# Patient Record
Sex: FEMALE | Race: WHITE | NOT HISPANIC OR LATINO | Employment: FULL TIME | URBAN - METROPOLITAN AREA
[De-identification: names, ages, dates, MRNs, and addresses within clinical notes are randomized per-mention and may not be internally consistent; named-entity substitution may affect disease eponyms.]

---

## 2023-03-06 LAB — URINE CULTURE: NO GROWTH

## 2023-05-27 LAB — URINE CULTURE: NORMAL

## 2023-08-26 LAB — URINE CULTURE: NORMAL

## 2023-12-26 ENCOUNTER — LAB REQUISITION (OUTPATIENT)
Dept: LAB | Facility: HOSPITAL | Age: 28
End: 2023-12-26
Payer: COMMERCIAL

## 2023-12-26 DIAGNOSIS — R30.0 DYSURIA: ICD-10-CM

## 2023-12-26 DIAGNOSIS — L29.2 PRURITUS VULVAE: ICD-10-CM

## 2023-12-26 PROCEDURE — 87086 URINE CULTURE/COLONY COUNT: CPT

## 2023-12-27 LAB
BACTERIAL VAGINOSIS VAG-IMP: ABNORMAL
CLUE CELLS VAG LPF-#/AREA: PRESENT /[LPF]
NUGENT SCORE: 6
YEAST VAG WET PREP-#/AREA: PRESENT

## 2023-12-29 LAB — BACTERIA UR CULT: NORMAL

## 2024-01-03 ENCOUNTER — OFFICE VISIT (OUTPATIENT)
Dept: OBSTETRICS AND GYNECOLOGY | Facility: CLINIC | Age: 29
End: 2024-01-03
Payer: COMMERCIAL

## 2024-01-03 VITALS
DIASTOLIC BLOOD PRESSURE: 78 MMHG | WEIGHT: 155 LBS | SYSTOLIC BLOOD PRESSURE: 124 MMHG | HEIGHT: 64 IN | BODY MASS INDEX: 26.46 KG/M2

## 2024-01-03 DIAGNOSIS — N89.8 VAGINA ITCHING: Primary | ICD-10-CM

## 2024-01-03 PROCEDURE — 1036F TOBACCO NON-USER: CPT | Performed by: MIDWIFE

## 2024-01-03 PROCEDURE — 99213 OFFICE O/P EST LOW 20 MIN: CPT | Performed by: MIDWIFE

## 2024-01-03 NOTE — PROGRESS NOTES
Subjective   Patient ID: Grace Metzger is a 28 y.o. female who presents for vulvar itch and swelling. Pt says sx started and 12/23 she was seen at ER and culture showed VVC and BV for which she was txed but she says she had hives after fluconazole and nausea after Flagyl and did not complete either  Pt says she has been using OTC Monistat 7 and she has had improvement in her sx with this but not fully    HPI  PMHx: pt says she had Covid dx 14 days ago  SocHx: with partner 1 yr    Review of Systems   Genitourinary:  Positive for vaginal discharge and vaginal pain.       Objective   Physical Exam  Genitourinary:     General: Normal vulva.      Comments: Scant whitish discharge      Physical Exam  Vitals and nursing note reviewed.   Constitutional:       Appearance: Normal appearance.   HENT:     Head/Face: Normal  Eyes:      Pupils: Pupils are equal, round, and reactive to light.   Pulmonary:      Effort: Pulmonary effort is normal.     Abdominal:      Palpations: Abdomen is soft. There is no mass.      Tenderness: There is no abdominal tenderness.   Musculoskeletal:         General: Normal range of motion.   Lymphadenopathy:      Cervical: No cervical adenopathy.         General: Skin is warm and dry.   Neurological:      Mental Status: She is alert and oriented    Psychiatric:         Mood and Affect: Mood normal.     Assessment/Plan   Diagnoses and all orders for this visit:  Vagina itching    Reassurance given re exam findings  Vulvar care discussed at length  Tx to be based on [facility] results  RTO AE/PRN       XIOMARA Frye, ND 01/03/24 3:43 PM

## 2024-01-08 DIAGNOSIS — B96.89 BACTERIAL VAGINOSIS: Primary | ICD-10-CM

## 2024-01-08 DIAGNOSIS — N76.0 BACTERIAL VAGINOSIS: Primary | ICD-10-CM

## 2024-01-08 RX ORDER — CLINDAMYCIN HYDROCHLORIDE 300 MG/1
CAPSULE ORAL
Qty: 14 CAPSULE | Refills: 0 | Status: SHIPPED | OUTPATIENT
Start: 2024-01-08 | End: 2024-01-20 | Stop reason: WASHOUT

## 2024-01-12 ENCOUNTER — APPOINTMENT (OUTPATIENT)
Dept: OBSTETRICS AND GYNECOLOGY | Facility: CLINIC | Age: 29
End: 2024-01-12
Payer: COMMERCIAL

## 2024-01-20 ENCOUNTER — OFFICE VISIT (OUTPATIENT)
Dept: OBSTETRICS AND GYNECOLOGY | Facility: CLINIC | Age: 29
End: 2024-01-20
Payer: COMMERCIAL

## 2024-01-20 VITALS
DIASTOLIC BLOOD PRESSURE: 75 MMHG | TEMPERATURE: 97 F | WEIGHT: 148 LBS | HEART RATE: 93 BPM | BODY MASS INDEX: 25.4 KG/M2 | SYSTOLIC BLOOD PRESSURE: 112 MMHG

## 2024-01-20 DIAGNOSIS — R35.0 INCREASED URINARY FREQUENCY: Primary | ICD-10-CM

## 2024-01-20 DIAGNOSIS — R30.0 DYSURIA: ICD-10-CM

## 2024-01-20 LAB
POC BLOOD, URINE: NEGATIVE
POC COLOR, URINE: YELLOW
POC GLUCOSE, URINE: NEGATIVE MG/DL
POC KETONES, URINE: NEGATIVE MG/DL
POC LEUKOCYTES, URINE: NEGATIVE

## 2024-01-20 PROCEDURE — 81003 URINALYSIS AUTO W/O SCOPE: CPT | Performed by: MIDWIFE

## 2024-01-20 PROCEDURE — 87086 URINE CULTURE/COLONY COUNT: CPT

## 2024-01-20 PROCEDURE — 1036F TOBACCO NON-USER: CPT | Performed by: MIDWIFE

## 2024-01-20 PROCEDURE — 99213 OFFICE O/P EST LOW 20 MIN: CPT | Performed by: MIDWIFE

## 2024-01-20 ASSESSMENT — ENCOUNTER SYMPTOMS
DYSURIA: 1
FREQUENCY: 1

## 2024-01-20 NOTE — PROGRESS NOTES
"Subjective   Patient ID: Grace Metzger is a 28 y.o. female who presents for c/o x2-3 days dysuria, urinary frequency/urgency and strong odor to urine.  Pt says she has \"an icy feeling below bellybutton\".  Pt completed clindamycin for tx of BV earlier this week and is no longer having vaginal c/o.  No chief complaint on file..  HPI  PMHx: BV txed 1/8/24  SocHx: pt drinks mostly water and juice, rare caffeine; with partner 1 yr  ROS: no flank pain, no vaginal c/o, no fever, NAD  Review of Systems   Genitourinary:  Positive for dysuria, frequency and urgency.        Suprapubic \"icy feeling\"   All other systems reviewed and are negative.      Objective   Physical Exam  Abdominal:      Tenderness: There is no right CVA tenderness or left CVA tenderness.       Physical Exam  Vitals and nursing note reviewed.   Constitutional:       Appearance: Normal appearance.   HENT:     Head/Face: Normal  Eyes:      Pupils: Pupils are equal, round, and reactive to light.   Pulmonary:      Effort: Pulmonary effort is normal.     Abdominal:      Palpations: Abdomen is soft. There is no mass.      Tenderness: There is no abdominal tenderness.   Musculoskeletal:         General: Normal range of motion.   Lymphadenopathy:      Cervical: No cervical adenopathy.         General: Skin is warm and dry.   Neurological:      Mental Status: She is alert and oriented    Psychiatric:         Mood and Affect: Mood normal.     Assessment/Plan   Diagnoses and all orders for this visit:  Increased urinary frequency  -     Urine Culture  Dysuria  -     POCT UA Automated manually resulted  -     Urine Culture    We discussed exam findings suggestive of bladder infection, presumptive tx offered as we await urine culture results.  Pt declined presumptive tx, stating she gets relief when she takes ibuprofen.  Pt was advised to increase water intake and to contact our office if sx worsen.  RTO AE/PRN       XIOMARA Frye, ND 01/20/24 10:43 AM   "

## 2024-01-22 LAB — BACTERIA UR CULT: NORMAL

## 2024-06-05 ENCOUNTER — LAB REQUISITION (OUTPATIENT)
Dept: LAB | Facility: HOSPITAL | Age: 29
End: 2024-06-05
Payer: COMMERCIAL

## 2024-06-05 DIAGNOSIS — N76.0 ACUTE VAGINITIS: ICD-10-CM

## 2024-06-05 PROCEDURE — 87205 SMEAR GRAM STAIN: CPT

## 2024-06-07 LAB
CLUE CELLS VAG LPF-#/AREA: ABNORMAL /[LPF]
NUGENT SCORE: 2
YEAST VAG WET PREP-#/AREA: PRESENT

## 2024-06-12 ENCOUNTER — APPOINTMENT (OUTPATIENT)
Dept: OBSTETRICS AND GYNECOLOGY | Facility: CLINIC | Age: 29
End: 2024-06-12
Payer: COMMERCIAL

## 2024-06-12 VITALS — WEIGHT: 154 LBS | SYSTOLIC BLOOD PRESSURE: 118 MMHG | BODY MASS INDEX: 26.43 KG/M2 | DIASTOLIC BLOOD PRESSURE: 64 MMHG

## 2024-06-12 DIAGNOSIS — N89.8 VAGINAL IRRITATION: Primary | ICD-10-CM

## 2024-06-12 PROCEDURE — 99213 OFFICE O/P EST LOW 20 MIN: CPT | Performed by: MIDWIFE

## 2024-06-12 PROCEDURE — 1036F TOBACCO NON-USER: CPT | Performed by: MIDWIFE

## 2024-06-12 RX ORDER — TERCONAZOLE 8 MG/G
CREAM VAGINAL
Qty: 20 G | Refills: 0 | Status: SHIPPED | OUTPATIENT
Start: 2024-06-12

## 2024-06-12 NOTE — PROGRESS NOTES
Subjective   Patient ID: Grace Metzger is a 28 y.o. female who presents for vaginal irritation. That started about 7 days ago and has improved but not fully resolved since using 3 day Terconazole which she finished 2 days ago that she was Rxed at ER.   She continues to have very mild irritation.  HPI  PMHx: h/o allergic rxn to Fluconazole but Terconazole is tolerated w/o problems per pt  SocHx: 2 yrs with partner, last IC 2 wks ago  ROS: no pelvic pain, no urinary c/o, NAD    Review of Systems    Objective   Physical Exam  HENT:      Head: Normocephalic.   Pulmonary:      Effort: Pulmonary effort is normal.   Genitourinary:     Vagina: Vaginal discharge present.      Comments: Mild vulvar erythema w/o lesions; whitish discharge scant  Neurological:      Mental Status: She is alert.   Psychiatric:         Behavior: Behavior normal.         Thought Content: Thought content normal.         Assessment/Plan   Diagnoses and all orders for this visit:  Vaginal irritation  -     terconazole (Terazol 3) 0.8 % vaginal cream; Apply pea-sized amount of cream to affected area x 3 days.    Exam findings and vulvar care discussed  RTO AE/PRN       ELSA Frye-IRAJ, ND 06/12/24 2:27 PM    No

## 2024-12-30 ENCOUNTER — OFFICE VISIT (OUTPATIENT)
Dept: OBSTETRICS AND GYNECOLOGY | Facility: CLINIC | Age: 29
End: 2024-12-30
Payer: COMMERCIAL

## 2024-12-30 VITALS
WEIGHT: 167 LBS | BODY MASS INDEX: 28.51 KG/M2 | HEIGHT: 64 IN | DIASTOLIC BLOOD PRESSURE: 82 MMHG | SYSTOLIC BLOOD PRESSURE: 128 MMHG

## 2024-12-30 DIAGNOSIS — N76.0 BV (BACTERIAL VAGINOSIS): Primary | ICD-10-CM

## 2024-12-30 DIAGNOSIS — Z11.3 SCREENING EXAMINATION FOR STI: ICD-10-CM

## 2024-12-30 DIAGNOSIS — R35.0 URINARY FREQUENCY: ICD-10-CM

## 2024-12-30 DIAGNOSIS — B96.89 BV (BACTERIAL VAGINOSIS): Primary | ICD-10-CM

## 2024-12-30 LAB
POC BLOOD, URINE: NEGATIVE
POC GLUCOSE, URINE: NEGATIVE MG/DL
POC LEUKOCYTES, URINE: NEGATIVE
POC NITRITE,URINE: NEGATIVE
POC PROTEIN, URINE: NEGATIVE MG/DL

## 2024-12-30 PROCEDURE — 99213 OFFICE O/P EST LOW 20 MIN: CPT | Performed by: MIDWIFE

## 2024-12-30 PROCEDURE — 3008F BODY MASS INDEX DOCD: CPT | Performed by: MIDWIFE

## 2024-12-30 PROCEDURE — 87591 N.GONORRHOEAE DNA AMP PROB: CPT

## 2024-12-30 PROCEDURE — 87491 CHLMYD TRACH DNA AMP PROBE: CPT

## 2024-12-30 PROCEDURE — 1036F TOBACCO NON-USER: CPT | Performed by: MIDWIFE

## 2024-12-30 PROCEDURE — 81002 URINALYSIS NONAUTO W/O SCOPE: CPT | Performed by: MIDWIFE

## 2024-12-30 RX ORDER — CLINDAMYCIN HYDROCHLORIDE 300 MG/1
300 CAPSULE ORAL 2 TIMES DAILY
Qty: 14 CAPSULE | Refills: 0 | Status: SHIPPED | OUTPATIENT
Start: 2024-12-30 | End: 2025-01-06

## 2024-12-30 NOTE — PROGRESS NOTES
Subjective   Patient ID: Grace Metzger is a 29 y.o. female who presents for Vaginitis/Bacterial Vaginosis. Pt c/o vaginal irritation and was seen at outside provider 24 with vaginal culture showing +BV.  She also c/o urinary frequency for the past week.   HPI  PMHx: ; Vaginal pathogen testing +BV 24; h/o rxn to fluconazole but terconazole is tolerated  SocHx: with partner 2 yrs condoms used  ROS: no pelvic pain, no urinary c/o, NAD  Review of Systems    Objective   Physical Exam  Constitutional:       Appearance: Normal appearance.   HENT:      Head: Normocephalic.   Pulmonary:      Effort: Pulmonary effort is normal.   Genitourinary:     General: Normal vulva.      Urethra: Urethral lesion present. No prolapse, urethral pain or urethral swelling.      Vagina: Normal.   Lymphadenopathy:      Lower Body: No right inguinal adenopathy. No left inguinal adenopathy.   Neurological:      Mental Status: She is alert.   Psychiatric:         Behavior: Behavior normal.         Thought Content: Thought content normal.         Assessment/Plan   Diagnoses and all orders for this visit:  BV (bacterial vaginosis)  -     clindamycin (Cleocin) 300 mg capsule; Take 1 capsule (300 mg) by mouth 2 times a day for 7 days.  Urinary frequency  -     POCT UA (nonautomated) manually resulted  Screening examination for STI  -     C. trachomatis / N. gonorrhoeae, Amplified       Reassurance given re exam  Vulvar care discussed  RTO AE/PRN    XIOMARA Frye, ND 24 1:27 PM

## 2024-12-31 LAB
C TRACH RRNA SPEC QL NAA+PROBE: NEGATIVE
N GONORRHOEA DNA SPEC QL PROBE+SIG AMP: NEGATIVE

## 2025-01-08 ENCOUNTER — OFFICE VISIT (OUTPATIENT)
Dept: OBSTETRICS AND GYNECOLOGY | Facility: CLINIC | Age: 30
End: 2025-01-08
Payer: COMMERCIAL

## 2025-01-08 VITALS
HEIGHT: 64 IN | DIASTOLIC BLOOD PRESSURE: 58 MMHG | BODY MASS INDEX: 28.51 KG/M2 | WEIGHT: 167 LBS | SYSTOLIC BLOOD PRESSURE: 102 MMHG

## 2025-01-08 DIAGNOSIS — N89.8 VAGINAL IRRITATION: Primary | ICD-10-CM

## 2025-01-08 DIAGNOSIS — R35.0 FREQUENCY OF MICTURITION: ICD-10-CM

## 2025-01-08 PROCEDURE — 3008F BODY MASS INDEX DOCD: CPT | Performed by: MIDWIFE

## 2025-01-08 PROCEDURE — 1036F TOBACCO NON-USER: CPT | Performed by: MIDWIFE

## 2025-01-08 PROCEDURE — 99213 OFFICE O/P EST LOW 20 MIN: CPT | Performed by: MIDWIFE

## 2025-01-08 PROCEDURE — 87205 SMEAR GRAM STAIN: CPT

## 2025-01-08 PROCEDURE — 81002 URINALYSIS NONAUTO W/O SCOPE: CPT | Performed by: MIDWIFE

## 2025-01-08 PROCEDURE — 87086 URINE CULTURE/COLONY COUNT: CPT

## 2025-01-08 NOTE — PROGRESS NOTES
Subjective   Patient ID: Grace Metzger is a 29 y.o. female who presents for vulvar concerns and Urinary Frequency. Pt finished Clindamycin tx for BV 2 days ago.  She says that 2 days ago she had a 25 minute episode of vulvovaginal irritation worse near her clitoris, and that yesterday she had urinary frequency w/o dysuria. She is concerned that vaginitis is not resolved and that she may now have a UTI.  HPI  PMHx: ; vaginal pathogen testing +BV 24; h/o rxn to fluconazole but terconazole is tolerated per pt report; GC/CT testing negative 24  SocHx: 2 yrs with partner, SA, pt says she drinks water and gatorade all day  ROS: no pelvic pain, no CVAT, no flank pain, NAD  Review of Systems    Objective   Physical Exam  Constitutional:       Appearance: Normal appearance.   HENT:      Head: Normocephalic.   Pulmonary:      Effort: Pulmonary effort is normal.   Abdominal:      Tenderness: There is no right CVA tenderness or left CVA tenderness.   Genitourinary:     General: Normal vulva.      Urethra: No prolapse, urethral pain, urethral swelling or urethral lesion.      Vagina: Normal.      Comments: Scant whitish discharge  Lymphadenopathy:      Lower Body: No right inguinal adenopathy. No left inguinal adenopathy.   Neurological:      Mental Status: She is alert.   Psychiatric:         Behavior: Behavior normal.         Thought Content: Thought content normal.         Assessment/Plan   Diagnoses and all orders for this visit:  Vaginal irritation  -     Vaginitis Gram Stain For Bacterial Vaginosis + Yeast  Frequency of micturition  -     POCT UA (nonautomated) manually resulted  -     Urine Culture       Reassurance given re exam  Vulvar care discussed and pt advised to start use of good quality probiotics, given h/o vaginitis  Tx to be based on testing sent today.  RTO AE/PRN    XIOMARA Frye, ND 25 9:11 AM

## 2025-01-09 LAB
BACTERIA UR CULT: NORMAL
BACTERIAL VAGINOSIS VAG-IMP: NORMAL
CLUE CELLS VAG LPF-#/AREA: NORMAL /[LPF]
NUGENT SCORE: 4
YEAST VAG WET PREP-#/AREA: NORMAL

## 2025-04-30 ENCOUNTER — OFFICE VISIT (OUTPATIENT)
Dept: OBSTETRICS AND GYNECOLOGY | Facility: CLINIC | Age: 30
End: 2025-04-30
Payer: COMMERCIAL

## 2025-04-30 VITALS
WEIGHT: 165 LBS | BODY MASS INDEX: 28.17 KG/M2 | SYSTOLIC BLOOD PRESSURE: 112 MMHG | HEIGHT: 64 IN | DIASTOLIC BLOOD PRESSURE: 66 MMHG

## 2025-04-30 DIAGNOSIS — N89.8 VAGINAL IRRITATION: Primary | ICD-10-CM

## 2025-04-30 PROCEDURE — 3008F BODY MASS INDEX DOCD: CPT | Performed by: MIDWIFE

## 2025-04-30 PROCEDURE — 99213 OFFICE O/P EST LOW 20 MIN: CPT | Performed by: MIDWIFE

## 2025-04-30 PROCEDURE — 1036F TOBACCO NON-USER: CPT | Performed by: MIDWIFE

## 2025-04-30 NOTE — PROGRESS NOTES
"Subjective   Patient ID: Grace Metzger is a 29 y.o. female who presents for vulvar vaginal itch. That started 6 days ago and pt used Monistat 7 for 4 days and sx improved.  She stopped Monistat use 2 days ago in preparation for this visit, but then menses started today.    HPI  PMHx: ; vaginal pathogen testing +BV 24; h/o rxn to fluconazole but terconazole is tolerated per pt report; GC/CT testing negative 24  SocHx: 2 yrs with partner, SA-- condoms used \"always\" per pt, pt says she drinks water and gatorade all day; pt says that she has not been eating well lately  ROS: no pelvic pain, no CVAT, no flank pain,  Review of Systems    Objective   Physical Exam  Constitutional:       Appearance: Normal appearance.   HENT:      Head: Normocephalic.   Pulmonary:      Effort: Pulmonary effort is normal.   Genitourinary:     General: Normal vulva.      Vagina: Bleeding present.   Neurological:      Mental Status: She is alert.   Psychiatric:         Behavior: Behavior normal.         Thought Content: Thought content normal.         Assessment/Plan   Diagnoses and all orders for this visit:  Vaginal irritation  -     QUEST MISCELLANEOUS TEST (ROOM TEMPERATURE)    Vulvovaginal skin care discussed.  Tx to be based on testing  RTO AE/PRN       XIOMARA Frye, ND 25 2:03 PM   "

## 2025-05-01 DIAGNOSIS — B96.89 BV (BACTERIAL VAGINOSIS): Primary | ICD-10-CM

## 2025-05-01 DIAGNOSIS — N76.0 BV (BACTERIAL VAGINOSIS): Primary | ICD-10-CM

## 2025-05-01 LAB — QUEST FLEXITEST1 RESULTS:: NORMAL

## 2025-05-01 RX ORDER — CLINDAMYCIN HYDROCHLORIDE 300 MG/1
CAPSULE ORAL
Qty: 14 CAPSULE | Refills: 0 | Status: SHIPPED | OUTPATIENT
Start: 2025-05-01

## 2025-05-14 ENCOUNTER — OFFICE VISIT (OUTPATIENT)
Dept: OBSTETRICS AND GYNECOLOGY | Facility: CLINIC | Age: 30
End: 2025-05-14
Payer: COMMERCIAL

## 2025-05-14 VITALS — WEIGHT: 165 LBS | BODY MASS INDEX: 28.32 KG/M2 | SYSTOLIC BLOOD PRESSURE: 110 MMHG | DIASTOLIC BLOOD PRESSURE: 72 MMHG

## 2025-05-14 DIAGNOSIS — N89.8 VAGINAL ITCHING: Primary | ICD-10-CM

## 2025-05-14 PROCEDURE — 1036F TOBACCO NON-USER: CPT | Performed by: MIDWIFE

## 2025-05-14 PROCEDURE — 99213 OFFICE O/P EST LOW 20 MIN: CPT | Performed by: MIDWIFE

## 2025-05-14 NOTE — PROGRESS NOTES
Subjective   Patient ID: Grace Metzger is a 29 y.o. female who presents for Vaginal Itching. That started after last IC 4 days ago with increased vaginal discharge    PMHx: ; vaginal pathogen testing +BV 24; h/o rxn to fluconazole but terconazole is tolerated per pt report; GC/CT testing negative 24   SocHx: 2 yrs with partner condoms used always; pt says she has been eating more sugar lately d/t stress  ROS: no pelvic pain, no urinary c/o, NAD  Vaginal Itching        Review of Systems    Objective   Physical Exam  Constitutional:       Appearance: Normal appearance.   HENT:      Head: Normocephalic.   Pulmonary:      Effort: Pulmonary effort is normal.   Genitourinary:     General: Normal vulva.      Vagina: Vaginal discharge present.      Comments: Thin whitish discharge  Neurological:      Mental Status: She is alert.   Psychiatric:         Behavior: Behavior normal.         Thought Content: Thought content normal.         Assessment/Plan   Diagnoses and all orders for this visit:  Vaginal itching  -     QUEST MISCELLANEOUS TEST (ROOM TEMPERATURE)    Vulvovaginal care discussed.  Pt advised to limit intake of sugar  RTO AE/PRN       ELSA Frye-IRAJ, ND 25 1:18 PM

## 2025-05-15 ENCOUNTER — TELEPHONE (OUTPATIENT)
Dept: OBSTETRICS AND GYNECOLOGY | Facility: CLINIC | Age: 30
End: 2025-05-15
Payer: COMMERCIAL

## 2025-05-15 LAB — QUEST FLEXITEST1 RESULTS:: NORMAL

## 2025-05-16 ENCOUNTER — TELEMEDICINE (OUTPATIENT)
Dept: PRIMARY CARE | Facility: CLINIC | Age: 30
End: 2025-05-16
Payer: COMMERCIAL

## 2025-05-16 DIAGNOSIS — N76.0 BV (BACTERIAL VAGINOSIS): ICD-10-CM

## 2025-05-16 DIAGNOSIS — B96.89 BV (BACTERIAL VAGINOSIS): ICD-10-CM

## 2025-05-16 PROCEDURE — 99212 OFFICE O/P EST SF 10 MIN: CPT | Performed by: FAMILY MEDICINE

## 2025-05-16 RX ORDER — CLINDAMYCIN HYDROCHLORIDE 300 MG/1
CAPSULE ORAL
Qty: 14 CAPSULE | Refills: 0 | Status: SHIPPED | OUTPATIENT
Start: 2025-05-16

## 2025-05-16 NOTE — PROGRESS NOTES
Subjective   Patient ID: Grace Metzger is a 29 y.o. female who presents for BV    HPI   29-year-old female presents the [facility] virtual care visit requesting prescription for her bacterial vaginosis.  Was seen by her gynecologist on 5/14 and had a culture sent that came back positive for BV.  Her gynecologist is out of the office until Monday and she would like to get started on treatment.  She has a allergy to metronidazole.  She has a history of BV in the past and has been treated with oral clindamycin  Review of Systems  ROS as stated in HPI  Objective   LMP 04/30/2025     Physical Exam  Virtual visit so no physical exam was done; patient appears alert and oriented and in no acute distress  Assessment/Plan   Problem List Items Addressed This Visit    None  Visit Diagnoses         Codes      BV (bacterial vaginosis)     N76.0, B96.89    Relevant Medications    clindamycin (Cleocin) 300 mg capsule        Reviewed culture from 5/14 which was positive for BV   rx clindamycin   fu with gyn if symptoms persist or worsen  This visit was completed via VIRTUAL visit. All issues as above were discussed and addressed but no physical exam or vital signs were performed. If it was felt that the patient should be evaluated in clinic then they were directed there. The patient verbally consented to visit.

## 2025-05-19 DIAGNOSIS — B96.89 BV (BACTERIAL VAGINOSIS): ICD-10-CM

## 2025-05-19 DIAGNOSIS — N76.0 BV (BACTERIAL VAGINOSIS): ICD-10-CM

## 2025-05-19 RX ORDER — CLINDAMYCIN HYDROCHLORIDE 300 MG/1
CAPSULE ORAL
Qty: 14 CAPSULE | Refills: 0 | Status: SHIPPED | OUTPATIENT
Start: 2025-05-19

## 2025-06-08 ENCOUNTER — OFFICE VISIT (OUTPATIENT)
Dept: URGENT CARE | Facility: URGENT CARE | Age: 30
End: 2025-06-08
Payer: COMMERCIAL

## 2025-06-08 VITALS
SYSTOLIC BLOOD PRESSURE: 129 MMHG | DIASTOLIC BLOOD PRESSURE: 81 MMHG | WEIGHT: 167.77 LBS | BODY MASS INDEX: 28.64 KG/M2 | OXYGEN SATURATION: 98 % | TEMPERATURE: 98 F | HEART RATE: 58 BPM | RESPIRATION RATE: 16 BRPM | HEIGHT: 64 IN

## 2025-06-08 DIAGNOSIS — B37.31 VAGINA, CANDIDIASIS: ICD-10-CM

## 2025-06-08 DIAGNOSIS — N89.8 VAGINAL ITCHING: Primary | ICD-10-CM

## 2025-06-08 LAB
POC APPEARANCE, URINE: CLEAR
POC BACTERIAL VAGINITIS (RAPID): NEGATIVE
POC BILIRUBIN, URINE: NEGATIVE
POC BLOOD, URINE: NEGATIVE
POC COLOR, URINE: YELLOW
POC GLUCOSE, URINE: NEGATIVE MG/DL
POC KETONES, URINE: NEGATIVE MG/DL
POC LEUKOCYTES, URINE: NEGATIVE
POC NITRITE,URINE: NEGATIVE
POC PH, URINE: 7.5 PH
POC PROTEIN, URINE: NEGATIVE MG/DL
POC SPECIFIC GRAVITY, URINE: 1.01
POC UROBILINOGEN, URINE: 0.2 EU/DL
PREGNANCY TEST URINE, POC: NEGATIVE

## 2025-06-08 PROCEDURE — 87905 IA NZMTC ACTV OTH/THN VIRUS: CPT | Performed by: NURSE PRACTITIONER

## 2025-06-08 PROCEDURE — 81003 URINALYSIS AUTO W/O SCOPE: CPT | Performed by: NURSE PRACTITIONER

## 2025-06-08 PROCEDURE — 3008F BODY MASS INDEX DOCD: CPT | Performed by: NURSE PRACTITIONER

## 2025-06-08 PROCEDURE — 99214 OFFICE O/P EST MOD 30 MIN: CPT | Performed by: NURSE PRACTITIONER

## 2025-06-08 PROCEDURE — 1036F TOBACCO NON-USER: CPT | Performed by: NURSE PRACTITIONER

## 2025-06-08 PROCEDURE — 81025 URINE PREGNANCY TEST: CPT | Performed by: NURSE PRACTITIONER

## 2025-06-08 RX ORDER — ALPRAZOLAM 0.5 MG/1
TABLET ORAL
COMMUNITY
Start: 2024-09-04

## 2025-06-08 RX ORDER — FLUCONAZOLE 150 MG/1
150 TABLET ORAL DAILY
Qty: 2 TABLET | Refills: 0 | Status: SHIPPED | OUTPATIENT
Start: 2025-06-08 | End: 2025-06-10

## 2025-06-08 ASSESSMENT — ENCOUNTER SYMPTOMS
DIARRHEA: 0
DIAPHORESIS: 0
ABDOMINAL PAIN: 0
DIFFICULTY URINATING: 0
CHILLS: 0
MYALGIAS: 0
NECK PAIN: 0
BACK PAIN: 0
ACTIVITY CHANGE: 0
FATIGUE: 0
LIGHT-HEADEDNESS: 0
NECK STIFFNESS: 0
VOMITING: 0
FREQUENCY: 0
ABDOMINAL DISTENTION: 0
BLOOD IN STOOL: 0
CHEST TIGHTNESS: 0
CONFUSION: 0
DYSURIA: 0
NAUSEA: 0
FEVER: 0
PALPITATIONS: 0
SHORTNESS OF BREATH: 0
WHEEZING: 0
HEMATURIA: 0
APPETITE CHANGE: 0
HEADACHES: 0
COUGH: 0
DIZZINESS: 0
FLANK PAIN: 0

## 2025-06-08 NOTE — PATIENT INSTRUCTIONS
Increase fluids  Eat a well balanced diet   Tylenol or motrin for fevers  Rx medications or over the counter meds as discussed or ordered  Read and follow preprinted instructions  As always you are invited to return if your condition should change or worsen in any way  If your condition worsens or becomes severe or life threatening, please go to the nearest emergency department  Follow up with your family dr in 3 days if no better.   Do not take Xanax for the next 6 days while you are taking the Diflucan

## 2025-06-08 NOTE — PROGRESS NOTES
Subjective   Patient ID: Grace Metzger is a 29 y.o. female. They present today with a chief complaint of Vaginal Itching (Pt says she only has normal vaginal discharge, vaginal itching 2 days).    History of Present Illness  Chief complaint: Vaginal itching onset x 2 days      HPI: Vaginal itching x 2 days.  No vaginal bleeding or discharge no abdominal pain no flank pain no fevers or chills.  No pelvic pain.  Patient reports normal intake and output without nausea vomiting or diarrhea.  States she is here for testing only for BV.  Recent diagnosis 2 weeks ago with BV finished a course of clindamycin.  Patient is allergic to Flagyl.       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 She has never used smokeless tobacco.    Review of Systems  Review of Systems   Constitutional:  Negative for activity change, appetite change, chills, diaphoresis, fatigue and fever.   HENT: Negative.     Respiratory:  Negative for cough, chest tightness, shortness of breath and wheezing.    Cardiovascular:  Negative for chest pain, palpitations and leg swelling.   Gastrointestinal:  Negative for abdominal distention, abdominal pain, blood in stool, diarrhea, nausea and vomiting.   Genitourinary:  Negative for decreased urine volume, difficulty urinating, dyspareunia, dysuria, flank  "pain, frequency, genital sores, hematuria, menstrual problem, pelvic pain, urgency, vaginal bleeding, vaginal discharge and vaginal pain.        Vaginal itching only  Denies chance of pregnancy last menses was 2 weeks ago   Musculoskeletal:  Negative for back pain, myalgias, neck pain and neck stiffness.   Skin: Negative.    Neurological:  Negative for dizziness, syncope, light-headedness and headaches.   Hematological:         No blood thinners   Psychiatric/Behavioral:  Negative for confusion.                                   Objective    Vitals:    06/08/25 1212   BP: 129/81   Pulse: 58   Resp: 16   Temp: 36.7 °C (98 °F)   TempSrc: Oral   SpO2: 98%   Weight: 76.1 kg (167 lb 12.3 oz)   Height: 1.626 m (5' 4\")     Patient's last menstrual period was 05/26/2025 (exact date).    Physical Exam  Vitals and nursing note reviewed. Exam conducted with a chaperone present.   Constitutional:       General: She is awake. She is not in acute distress.     Appearance: Normal appearance. She is well-developed and well-groomed. She is not ill-appearing, toxic-appearing or diaphoretic.   HENT:      Head: Normocephalic and atraumatic.      Nose: Nose normal.      Mouth/Throat:      Mouth: Mucous membranes are moist.      Pharynx: Oropharynx is clear.   Eyes:      General: No scleral icterus.     Conjunctiva/sclera: Conjunctivae normal.   Cardiovascular:      Rate and Rhythm: Normal rate and regular rhythm.      Pulses: Normal pulses.      Heart sounds: Normal heart sounds.   Pulmonary:      Effort: Pulmonary effort is normal.      Breath sounds: Normal breath sounds.   Abdominal:      General: Bowel sounds are normal. There is no distension. There are no signs of injury.      Palpations: Abdomen is soft.      Tenderness: There is no abdominal tenderness. There is no right CVA tenderness or left CVA tenderness.      Comments: Negative Orellana Soto sign negative [place] sign      Genital area deferred no symptoms.   Genitourinary:    "  Comments: With female chaperone present.  Shahana assisted me in external vaginal exam only.  External vaginal area was moist with mild to moderate erythema.  Suspect early vaginal candidiasis.  Introitus was negative for bleeding or drainage.  No injuries no rashes no insects.  Musculoskeletal:      Comments: Moves all extremities without notable deficits   Skin:     General: Skin is warm.      Capillary Refill: Capillary refill takes less than 2 seconds.      Coloration: Skin is not cyanotic, jaundiced or pale.   Neurological:      General: No focal deficit present.      Mental Status: She is alert and oriented to person, place, and time.      GCS: GCS eye subscore is 4. GCS verbal subscore is 5. GCS motor subscore is 6.      Comments: Answering all questions briskly and appropriately.   Psychiatric:         Attention and Perception: Attention and perception normal.         Mood and Affect: Mood normal.         Speech: Speech normal.         Behavior: Behavior normal. Behavior is cooperative.         Thought Content: Thought content normal.         Cognition and Memory: Cognition normal.         Judgment: Judgment normal.         Procedures    Point of Care Test & Imaging Results from this visit  No results found for this visit on 06/08/25.   Imaging  No results found.    Cardiology, Vascular, and Other Imaging  No other imaging results found for the past 2 days      Diagnostic study results (if any) were reviewed by APRIL Up.    Assessment/Plan   Allergies, medications, history, and pertinent labs/EKGs/Imaging reviewed by APRIL Up.     Medical Decision Making  HPI: SEE NARRATIVE  HISTORIAN: Pateint  INDEPENDENT HISTORIAN:   RECORDS REVIEWED:  DIFFERENTIAL DX: Vaginal candidiasis versus vaginal rash  LABS: Self vaginal swab test, urinalysis, hCG = vaginal swab negative urinalysis negative and hCG was negative  XRAY:  EKG:  IN CLINIC MEDICATIONS: Offered in clinic RX medications for  patient they declined stated they wanted it sent to their pharmacy.    CONSULTED WITH:  DIAGNOSIS: See urgent care course of treatment  SEE URGENT CARE COURSE OF TREATMENT AND DOCUMENTATION FOR RX MEDS GIVEN.   PROCEDURES: SEE PROCEDURE NOTE  DISCUSSIONS WITH PATIENT =  Patient and or family were counselled on labs, radiological studies, and all testing applicable for this visit as well as diagnosis. Patient was discharged homewith stable afebrile non-toxic vital signs. They verbalized discharge instructions that were given to them BOTH written and verbally by myself.  They were given ample time to ask questionsand have none at time of disposition.    Orders and Diagnoses  There are no diagnoses linked to this encounter.    Medical Admin Record      Patient disposition: Home    Electronically signed by APRIL Up  12:19 PM           [1] (Not in a hospital admission)  [2] No past medical history on file.  [3]   Past Surgical History:  Procedure Laterality Date    OTHER SURGICAL HISTORY  08/02/2021    No history of surgery

## 2025-06-09 ENCOUNTER — TELEPHONE (OUTPATIENT)
Dept: URGENT CARE | Facility: URGENT CARE | Age: 30
End: 2025-06-09

## 2025-06-09 NOTE — TELEPHONE ENCOUNTER
spoke with patient today, she is having a reaction to medication prescribed yesterday. She woke up this morning with a rash on hands the size of a gabby and swelling/itching/redness around eyes. Advised patient she is able to take benadryl but if symptoms get worse to head directly to ED.

## 2025-06-12 ENCOUNTER — OFFICE VISIT (OUTPATIENT)
Dept: OBSTETRICS AND GYNECOLOGY | Facility: CLINIC | Age: 30
End: 2025-06-12
Payer: COMMERCIAL

## 2025-06-12 VITALS — SYSTOLIC BLOOD PRESSURE: 110 MMHG | WEIGHT: 160 LBS | DIASTOLIC BLOOD PRESSURE: 70 MMHG | BODY MASS INDEX: 27.46 KG/M2

## 2025-06-12 DIAGNOSIS — N89.8 VAGINAL PRURITUS: Primary | ICD-10-CM

## 2025-06-12 PROCEDURE — 1036F TOBACCO NON-USER: CPT | Performed by: MIDWIFE

## 2025-06-12 PROCEDURE — 99213 OFFICE O/P EST LOW 20 MIN: CPT | Performed by: MIDWIFE

## 2025-06-12 NOTE — PROGRESS NOTES
Subjective   Patient ID: Grace Metzger is a 29 y.o. female who presents for vaginal irritation. That started after last IC about a week ago.  She was seen at Urgent care and txed for VVC with diflucan-- that caused her to get a rash on her arms/hands. So she then tried Monistat 7 that has been helping somewhat but vulvar burning/itch persists.    HPI  PMHx: ; vaginal pathogen testing +BV 24; h/o rxn to fluconazole but terconazole is tolerated per pt report; GC/CT testing negative 24   SocHx: 2 yrs with partner condoms used always; pt says she has been eating NO CHOs since last seen in our office (keto)-- only eating protein and fat  ROS: no pelvic pain, no urinary c/o, NAD  Review of Systems    Objective   Physical Exam  Constitutional:       Appearance: Normal appearance.   HENT:      Head: Normocephalic.   Pulmonary:      Effort: Pulmonary effort is normal.   Genitourinary:     General: Normal vulva.      Vagina: Vaginal discharge present.      Comments: Scant whitish discharge  Lymphadenopathy:      Lower Body: No right inguinal adenopathy. No left inguinal adenopathy.   Neurological:      Mental Status: She is alert.   Psychiatric:         Behavior: Behavior normal.         Thought Content: Thought content normal.         Assessment/Plan   Diagnoses and all orders for this visit:  Vaginal pruritus    Reassurance given re exam-- tx to be based on testing  Vulvovaginal care reviewed  Given recurrent vaginitis we will consider FU with vulvar specialist  RTO AE/PRN         XIOMARA Frye, ND 25 10:23 AM

## 2025-06-13 LAB — QUEST FLEXITEST1 RESULTS:: NORMAL

## 2025-06-16 ENCOUNTER — TELEPHONE (OUTPATIENT)
Dept: OBSTETRICS AND GYNECOLOGY | Facility: CLINIC | Age: 30
End: 2025-06-16
Payer: COMMERCIAL

## 2025-06-17 ENCOUNTER — PATIENT OUTREACH (OUTPATIENT)
Dept: OBSTETRICS AND GYNECOLOGY | Facility: HOSPITAL | Age: 30
End: 2025-06-17
Payer: COMMERCIAL

## 2025-06-17 DIAGNOSIS — B96.89 BV (BACTERIAL VAGINOSIS): Primary | ICD-10-CM

## 2025-06-17 DIAGNOSIS — N76.0 BV (BACTERIAL VAGINOSIS): Primary | ICD-10-CM

## 2025-06-17 RX ORDER — METRONIDAZOLE 7.5 MG/G
GEL VAGINAL DAILY
Qty: 70 G | Refills: 0 | Status: SHIPPED | OUTPATIENT
Start: 2025-06-17 | End: 2025-06-17 | Stop reason: SDUPTHER

## 2025-06-17 RX ORDER — METRONIDAZOLE 7.5 MG/G
GEL VAGINAL DAILY
Qty: 70 G | Refills: 0 | Status: SHIPPED | OUTPATIENT
Start: 2025-06-17 | End: 2025-06-22

## 2025-06-18 NOTE — TELEPHONE ENCOUNTER
"T/c to pt who has already started tx sent by Dr Gordon for Metrogel.  She says that she is tolerating the Metrogel well.  (Note: h/o allergic rxn to oral metronidazole).  We discussed new tx guidelines for recurrent BV that include partner treatment.  Pt declined this, stating that they \"always\" use condoms and have not recently engaged in oral/genital contact.  "

## 2025-07-08 ENCOUNTER — OFFICE VISIT (OUTPATIENT)
Dept: URGENT CARE | Age: 30
End: 2025-07-08
Payer: COMMERCIAL

## 2025-07-08 VITALS
RESPIRATION RATE: 18 BRPM | HEART RATE: 81 BPM | OXYGEN SATURATION: 100 % | SYSTOLIC BLOOD PRESSURE: 120 MMHG | BODY MASS INDEX: 28.04 KG/M2 | DIASTOLIC BLOOD PRESSURE: 83 MMHG | HEIGHT: 64 IN | TEMPERATURE: 98.4 F | WEIGHT: 164.24 LBS

## 2025-07-08 DIAGNOSIS — N89.8 VAGINAL DISCHARGE: Primary | ICD-10-CM

## 2025-07-08 DIAGNOSIS — N94.89 VAGINAL BURNING: ICD-10-CM

## 2025-07-08 LAB
POC APPEARANCE, URINE: CLEAR
POC BACTERIAL VAGINITIS (RAPID): NEGATIVE
POC BILIRUBIN, URINE: NEGATIVE
POC BLOOD, URINE: ABNORMAL
POC COLOR, URINE: YELLOW
POC GLUCOSE, URINE: NEGATIVE MG/DL
POC KETONES, URINE: ABNORMAL MG/DL
POC LEUKOCYTES, URINE: NEGATIVE
POC NITRITE,URINE: NEGATIVE
POC PH, URINE: 6 PH
POC PROTEIN, URINE: NEGATIVE MG/DL
POC SPECIFIC GRAVITY, URINE: 1.02
POC UROBILINOGEN, URINE: 0.2 EU/DL
PREGNANCY TEST URINE, POC: NEGATIVE

## 2025-07-08 PROCEDURE — 87905 IA NZMTC ACTV OTH/THN VIRUS: CPT

## 2025-07-08 PROCEDURE — 99213 OFFICE O/P EST LOW 20 MIN: CPT

## 2025-07-08 PROCEDURE — 81025 URINE PREGNANCY TEST: CPT

## 2025-07-08 PROCEDURE — 3008F BODY MASS INDEX DOCD: CPT

## 2025-07-08 PROCEDURE — 81003 URINALYSIS AUTO W/O SCOPE: CPT

## 2025-07-08 ASSESSMENT — ENCOUNTER SYMPTOMS
DIARRHEA: 0
HEMATURIA: 0
FEVER: 0
CONFUSION: 0
SORE THROAT: 0
NAUSEA: 0
WHEEZING: 0
WEAKNESS: 0
ABDOMINAL PAIN: 0
SHORTNESS OF BREATH: 0
COUGH: 0
CHILLS: 0
VOMITING: 0
FATIGUE: 0
CHEST TIGHTNESS: 0
ABDOMINAL DISTENTION: 0
BACK PAIN: 0
DIZZINESS: 0
DYSURIA: 0
FLANK PAIN: 0
FREQUENCY: 1

## 2025-07-08 ASSESSMENT — PATIENT HEALTH QUESTIONNAIRE - PHQ9
2. FEELING DOWN, DEPRESSED OR HOPELESS: NOT AT ALL
SUM OF ALL RESPONSES TO PHQ9 QUESTIONS 1 & 2: 0
1. LITTLE INTEREST OR PLEASURE IN DOING THINGS: NOT AT ALL

## 2025-07-08 NOTE — PROGRESS NOTES
Subjective   Patient ID: Grace Metzger is a 29 y.o. female. They present today with a chief complaint of Vaginal Burning  (PT recently dx with BV. PT states she used metronidazole gel. PT states the pill makes her vomit. PT c/o clear discharge, burning, no itching ) and Urinary Frequency (Frequency and odor.).    History of Present Illness  Patient is a 29-year-old female presenting to the clinic with complaints of urethral irritation.  Patient states she has had symptoms for a week burning at the urethra intermittently.  She denies any dysuria she states she does have increased urinary frequency however she has been drinking more water.  She denies any urgency.  She denies any CVA tenderness.  She states she has some back pressure lumbar region she denies any abdominal pain vomiting fevers or nausea she states she has clear vaginal discharge denies any risk of STDs states 1 sexual partner and is refusing STD testing.  She had symptoms of vaginal itching 3 weeks ago saw GYN was positive for BV treated with topical metronidazole gel negative for vaginal candidiasis.  Patient states she has a urinary odor.      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 She has never used smokeless tobacco. She reports that she does not drink alcohol and does not use  "drugs.    Review of Systems  Review of Systems   Constitutional:  Negative for chills, fatigue and fever.   HENT:  Negative for sore throat.    Respiratory:  Negative for cough, chest tightness, shortness of breath and wheezing.    Cardiovascular:  Negative for chest pain.   Gastrointestinal:  Negative for abdominal distention, abdominal pain, diarrhea, nausea and vomiting.   Genitourinary:  Positive for frequency, vaginal discharge and vaginal pain. Negative for decreased urine volume, dysuria, flank pain, hematuria and urgency.   Musculoskeletal:  Negative for back pain.   Skin:  Negative for rash.   Neurological:  Negative for dizziness and weakness.   Psychiatric/Behavioral:  Negative for confusion.    All other systems reviewed and are negative.                                 Objective    Vitals:    07/08/25 1850   BP: 120/83   Pulse: 81   Resp: 18   Temp: 36.9 °C (98.4 °F)   TempSrc: Oral   SpO2: 100%   Weight: 74.5 kg (164 lb 3.9 oz)   Height: 1.626 m (5' 4\")     Patient's last menstrual period was 07/01/2025 (approximate).    Physical Exam  Vitals reviewed.   Constitutional:       General: She is awake.      Appearance: Normal appearance. She is well-developed and well-groomed. She is not ill-appearing, toxic-appearing or diaphoretic.   HENT:      Head: Normocephalic and atraumatic.   Cardiovascular:      Rate and Rhythm: Normal rate and regular rhythm.      Heart sounds: Normal heart sounds, S1 normal and S2 normal. Heart sounds not distant. No murmur heard.     No friction rub. No gallop.   Pulmonary:      Effort: Pulmonary effort is normal. No accessory muscle usage, prolonged expiration or respiratory distress.      Breath sounds: Normal breath sounds and air entry. No stridor or decreased air movement. No decreased breath sounds, wheezing, rhonchi or rales.   Abdominal:      General: Abdomen is flat. There is no distension.      Palpations: Abdomen is soft. There is no mass.      Tenderness: There is " abdominal tenderness. There is no right CVA tenderness, left CVA tenderness, guarding or rebound.      Hernia: No hernia is present.      Comments: Mild tenderness suprapubic to deep palpation only. No signs of acute abdomen, No ovarian tenderness. No superficial tenderness.   Musculoskeletal:         General: Normal range of motion.      Comments: NO CVA. No tenderness to palaption of c spine t spine or l spine. No reproducible pain   Skin:     General: Skin is warm.   Neurological:      General: No focal deficit present.      Mental Status: She is alert and oriented to person, place, and time.         Procedures    Point of Care Test & Imaging Results from this visit  Results for orders placed or performed in visit on 07/08/25   POCT UA Automated manually resulted   Result Value Ref Range    POC Color, Urine Yellow Straw, Yellow, Light-Yellow    POC Appearance, Urine Clear Clear    POC Glucose, Urine NEGATIVE NEGATIVE mg/dl    POC Bilirubin, Urine NEGATIVE NEGATIVE    POC Ketones, Urine TRACE (A) NEGATIVE mg/dl    POC Specific Gravity, Urine 1.020 1.005 - 1.035    POC Blood, Urine TRACE-Intact (A) NEGATIVE    POC PH, Urine 6.0 No Reference Range Established PH    POC Protein, Urine NEGATIVE NEGATIVE mg/dl    POC Urobilinogen, Urine 0.2 0.2, 1.0 EU/DL    Poc Nitrite, Urine NEGATIVE NEGATIVE    POC Leukocytes, Urine NEGATIVE NEGATIVE   POCT pregnancy, urine manually resulted   Result Value Ref Range    Preg Test, Ur Negative Negative   POCT BV Blue Rapid - Bacterial Vaginitis manually resulted   Result Value Ref Range    POC Bacterial Vaginitis (Rapid) Negative Negative      Imaging  No results found.    Cardiology, Vascular, and Other Imaging  No other imaging results found for the past 2 days      Diagnostic study results (if any) were reviewed by [facility].    Assessment/Plan   Allergies, medications, history, and pertinent labs/EKGs/Imaging reviewed by Alex Wood PA-C.     Medical Decision  Making:    Patient is a 29-year-old female presenting to the clinic with complaints of urethral irritation.  Patient states she has had symptoms for a week burning at the urethra intermittently.  She denies any dysuria she states she does have increased urinary frequency however she has been drinking more water.  She denies any urgency.  She denies any CVA tenderness.  She states she has some back pressure lumbar region she denies any abdominal pain vomiting fevers or nausea she states she has clear vaginal discharge denies any risk of STDs states 1 sexual partner and is refusing STD testing.  She had symptoms of vaginal itching 3 weeks ago saw GYN was positive for BV treated with topical metronidazole gel negative for vaginal candidiasis.  Patient states she has a urinary odor.  Vital signs in the clinic are stable.  Physical examination as above.  Supervising physician consult on case.  Supervising physician recommending UTI treatment.  Patient is refusing antibiotic therapy.  Patient is refusing vaginal examination.  Patient states she wants to do a urine culture and a yeast test and does not want any treatment until results come back.  Supervising physician informed of patient's refusal.  Patient understands risks of waiting for results including worsening symptoms. Discharge instructions: Please follow up with your OBGYN within the next 5-7 days. Patient must follow-up as above.  If she develops worsening symptoms burning during urination, severe abdominal pain, severe back pain, worsening symptoms immediately go to the emergency room for evaluation. If you develop any fever chills severe back pain vomiting chest pain shortness of breath or difficulty breathing to go to the emergency room for evaluation. Patient is refusing antibiotic therapy and treatment this time.  Patient would like to wait until urine culture comes back and the yeast culture comes back.  Patient is going to follow-up with her GYN.  Patient  is also refusing a vaginal exam in the clinic. It is important to take prescriptions as prescribed and complete all antibiotics. If your symptoms worsen you are instructed to immediately go to the emergency room for reevaluation and further assessment. If you develop any chest pain, SOB, or difficulty breathing you are instructed to go to the emergency room for reevaluation. All discharge instructions will be provided and explained to the patient at discharge. If you have any questions regarding your treatment plan please call the [facility] urgent care clinic.  Ectopic 0% negative pregnancy. No signs of acute abdomen. No ovarian tenderness torsion unlikely. Suspicion for UTI vs candadiasis vs urehritis, Patient refusing medical therapy currently. Refusing vaginal exam. Educated on ER precautions and appropriate follow up.    Orders and Diagnoses  Diagnoses and all orders for this visit:  Vaginal burning  -     POCT UA Automated manually resulted  -     POCT pregnancy, urine manually resulted  -     POCT BV Blue Rapid - Bacterial Vaginitis manually resulted  -     Urine Culture      Medical Admin Record      Patient disposition: Home    Electronically signed by [facility]  8:10 PM           [1] (Not in a hospital admission)  [2] No past medical history on file.  [3]   Past Surgical History:  Procedure Laterality Date    OTHER SURGICAL HISTORY  08/02/2021    No history of surgery

## 2025-07-09 NOTE — PATIENT INSTRUCTIONS
Discharge instructions    Please follow up with your OBGYN within the next 5-7 days.    Patient must follow-up as above.  If she develops worsening symptoms burning during urination, severe abdominal pain, severe back pain, worsening symptoms immediately go to the emergency room for evaluation.    If you develop any fever chills severe back pain vomiting chest pain shortness of breath or difficulty breathing to go to the emergency room for evaluation.    Patient is refusing antibiotic therapy and treatment this time.  Patient would like to wait until urine culture comes back and the yeast culture comes back.  Patient is going to follow-up with her GYN.  Patient is also refusing a vaginal exam in the clinic.    It is important to take prescriptions as prescribed and complete all antibiotics.     If your symptoms worsen you are instructed to immediately go to the emergency room for reevaluation and further assessment.    If you develop any chest pain, SOB, or difficulty breathing you are instructed to go to the emergency room for reevaluation.    All discharge instructions will be provided and explained to the patient at discharge.    If you have any questions regarding your treatment plan please call the [facility] urgent care clinic.

## 2025-07-10 LAB
BACTERIA UR CULT: NORMAL
FUNGUS SPEC FUNGUS STN: NORMAL

## 2025-07-16 LAB — FUNGUS SPEC FUNGUS STN: NORMAL

## 2025-07-23 ENCOUNTER — APPOINTMENT (OUTPATIENT)
Dept: OBSTETRICS AND GYNECOLOGY | Facility: CLINIC | Age: 30
End: 2025-07-23
Payer: COMMERCIAL

## 2025-07-31 DIAGNOSIS — B00.9 HSV INFECTION: Primary | ICD-10-CM

## 2025-07-31 DIAGNOSIS — B00.9 HSV INFECTION: ICD-10-CM

## 2025-07-31 RX ORDER — VALACYCLOVIR HYDROCHLORIDE 500 MG/1
500 TABLET, FILM COATED ORAL
Qty: 10 TABLET | Refills: 2 | OUTPATIENT
Start: 2025-07-31 | End: 2025-08-05

## 2025-07-31 RX ORDER — VALACYCLOVIR HYDROCHLORIDE 500 MG/1
500 TABLET, FILM COATED ORAL 2 TIMES DAILY
Qty: 6 TABLET | Refills: 0 | Status: SHIPPED | OUTPATIENT
Start: 2025-07-31 | End: 2025-08-03

## 2025-07-31 RX ORDER — VALACYCLOVIR HYDROCHLORIDE 500 MG/1
500 TABLET, FILM COATED ORAL 2 TIMES DAILY
Qty: 6 TABLET | Refills: 0 | Status: SHIPPED | OUTPATIENT
Start: 2025-07-31 | End: 2025-07-31 | Stop reason: ENTERED-IN-ERROR

## 2025-08-13 ENCOUNTER — OFFICE VISIT (OUTPATIENT)
Dept: UROLOGY | Facility: HOSPITAL | Age: 30
End: 2025-08-13
Payer: COMMERCIAL

## 2025-08-13 VITALS
DIASTOLIC BLOOD PRESSURE: 72 MMHG | RESPIRATION RATE: 18 BRPM | OXYGEN SATURATION: 99 % | SYSTOLIC BLOOD PRESSURE: 114 MMHG | WEIGHT: 166 LBS | TEMPERATURE: 98.4 F | BODY MASS INDEX: 28.49 KG/M2 | HEART RATE: 67 BPM

## 2025-08-13 DIAGNOSIS — N76.0 RECURRENT VAGINITIS: Primary | ICD-10-CM

## 2025-08-13 PROBLEM — A60.00 GENITAL HERPES: Status: ACTIVE | Noted: 2025-08-13

## 2025-08-13 LAB
BACTERIAL VAGINOSIS VAG-IMP: NORMAL
CLUE CELLS VAG LPF-#/AREA: NORMAL /[LPF]
NUGENT SCORE: 6 (ref ?–6)
YEAST VAG WET PREP-#/AREA: NORMAL

## 2025-08-13 PROCEDURE — 99214 OFFICE O/P EST MOD 30 MIN: CPT | Performed by: OBSTETRICS & GYNECOLOGY

## 2025-08-13 PROCEDURE — 87205 SMEAR GRAM STAIN: CPT | Performed by: OBSTETRICS & GYNECOLOGY

## 2025-08-13 PROCEDURE — 99204 OFFICE O/P NEW MOD 45 MIN: CPT | Performed by: OBSTETRICS & GYNECOLOGY

## 2025-08-13 ASSESSMENT — PAIN SCALES - GENERAL: PAINLEVEL_OUTOF10: 0-NO PAIN

## 2025-10-13 ENCOUNTER — APPOINTMENT (OUTPATIENT)
Dept: OBSTETRICS AND GYNECOLOGY | Facility: CLINIC | Age: 30
End: 2025-10-13
Payer: COMMERCIAL